# Patient Record
Sex: FEMALE | Race: WHITE | NOT HISPANIC OR LATINO | Employment: UNEMPLOYED | URBAN - NONMETROPOLITAN AREA
[De-identification: names, ages, dates, MRNs, and addresses within clinical notes are randomized per-mention and may not be internally consistent; named-entity substitution may affect disease eponyms.]

---

## 2021-06-23 LAB
GENE DIS ANL CARRIER INTERP-IMP: NORMAL
PROT C ACT/NOR PPP: 102 % (ref 70–180)
PROT S ACT/NOR PPP: 93 % (ref 60–140)

## 2021-09-13 ENCOUNTER — HOSPITAL ENCOUNTER (EMERGENCY)
Facility: HOSPITAL | Age: 21
Discharge: HOME | End: 2021-09-13
Attending: EMERGENCY MEDICINE | Admitting: EMERGENCY MEDICINE
Payer: COMMERCIAL

## 2021-09-13 VITALS
DIASTOLIC BLOOD PRESSURE: 86 MMHG | WEIGHT: 152 LBS | HEIGHT: 62 IN | TEMPERATURE: 97.5 F | RESPIRATION RATE: 20 BRPM | BODY MASS INDEX: 27.97 KG/M2 | SYSTOLIC BLOOD PRESSURE: 144 MMHG | HEART RATE: 84 BPM | OXYGEN SATURATION: 100 %

## 2021-09-13 DIAGNOSIS — F15.10 METHAMPHETAMINE ABUSE (HCC): Primary | ICD-10-CM

## 2021-09-13 PROCEDURE — 99284 EMERGENCY DEPT VISIT MOD MDM: CPT | Performed by: EMERGENCY MEDICINE

## 2021-09-13 PROCEDURE — 99283 EMERGENCY DEPT VISIT LOW MDM: CPT

## 2021-09-13 RX ORDER — TOPIRAMATE 50 MG/1
50 TABLET, FILM COATED ORAL EVERY 12 HOURS SCHEDULED
COMMUNITY

## 2021-09-13 RX ORDER — SUMATRIPTAN 100 MG/1
1 TABLET, FILM COATED ORAL
COMMUNITY
Start: 2021-06-04

## 2021-09-13 RX ORDER — AMITRIPTYLINE HYDROCHLORIDE 10 MG/1
10 TABLET, FILM COATED ORAL DAILY
COMMUNITY
Start: 2021-06-04

## 2021-09-14 NOTE — ED NOTES
Made contact with Scott File from [facility], pt  Speaking with Scott File on the phone, made aware that pt  Is seeking drug rehabilitation,   Medically cleared by Dr Vicki Durbin, RN  09/13/21 [phone]

## 2021-09-14 NOTE — ED PROVIDER NOTES
History  Chief Complaint   Patient presents with    Drug / Alcohol Assessment     Pt reports being addicted to meth, last used thursday into friday 9/10, pt reports detoxing off drug and would like to get help with rehab placement  Patient is a 80-year-old female with history of methamphetamine abuse reports that she stop using meth on Friday last week and is presenting to the ED now seeking assistance with getting into a drug rehab  Patient denies any suicidal homicidal ideation  Patient admits to occasional cannabis abuse no alcohol or other drugs  History provided by:  Patient  Drug / Alcohol Assessment  Location:  Methamphetamine abuse  Severity:  Moderate  Onset quality:  Gradual  Duration:  4 days  Timing:  Constant  Progression:  Worsening  Chronicity:  Recurrent  Associated symptoms: no abdominal pain, no chest pain, no congestion, no cough, no diarrhea, no ear pain, no fatigue, no fever, no headaches, no myalgias, no nausea, no rash, no rhinorrhea, no shortness of breath, no sore throat, no vomiting and no wheezing        Prior to Admission Medications   Prescriptions Last Dose Informant Patient Reported? Taking? SUMAtriptan (IMITREX) 100 mg tablet   Yes Yes   Sig: Take 1 tablet by mouth   amitriptyline (ELAVIL) 10 mg tablet   Yes Yes   Sig: Take 10 mg by mouth daily   topiramate (TOPAMAX) 50 MG tablet More than a month at Unknown time  Yes No   Sig: Take 50 mg by mouth every 12 (twelve) hours      Facility-Administered Medications: None       History reviewed  No pertinent past medical history  History reviewed  No pertinent surgical history  History reviewed  No pertinent family history  I have reviewed and agree with the history as documented      E-Cigarette/Vaping     E-Cigarette/Vaping Substances     Social History     Tobacco Use    Smoking status: Current Every Day Smoker     Packs/day: 0 50    Smokeless tobacco: Never Used   Substance Use Topics    Alcohol use: Not Currently    Drug use: Yes     Types: Methamphetamines, Marijuana       Review of Systems   Constitutional: Negative for activity change, appetite change, chills, fatigue and fever  HENT: Negative for congestion, ear pain, rhinorrhea and sore throat  Eyes: Negative for discharge, redness and visual disturbance  Respiratory: Negative for cough, chest tightness, shortness of breath and wheezing  Cardiovascular: Negative for chest pain and palpitations  Gastrointestinal: Negative for abdominal pain, constipation, diarrhea, nausea and vomiting  Endocrine: Negative for polydipsia and polyuria  Genitourinary: Negative for difficulty urinating, dysuria, frequency, hematuria and urgency  Musculoskeletal: Negative for arthralgias and myalgias  Skin: Negative for color change, pallor and rash  Neurological: Negative for dizziness, weakness, light-headedness, numbness and headaches  Hematological: Negative for adenopathy  Does not bruise/bleed easily  Psychiatric/Behavioral: Positive for agitation  The patient is nervous/anxious  All other systems reviewed and are negative  Physical Exam  Physical Exam  Vitals and nursing note reviewed  Constitutional:       Appearance: She is well-developed  HENT:      Head: Normocephalic and atraumatic  Right Ear: External ear normal       Left Ear: External ear normal       Nose: Nose normal    Eyes:      Conjunctiva/sclera: Conjunctivae normal       Pupils: Pupils are equal, round, and reactive to light  Cardiovascular:      Rate and Rhythm: Normal rate and regular rhythm  Heart sounds: Normal heart sounds  Pulmonary:      Effort: Pulmonary effort is normal  No respiratory distress  Breath sounds: Normal breath sounds  No wheezing or rales  Chest:      Chest wall: No tenderness  Abdominal:      General: Bowel sounds are normal  There is no distension  Palpations: Abdomen is soft  Tenderness:  There is no abdominal tenderness  There is no guarding  Musculoskeletal:         General: Normal range of motion  Cervical back: Normal range of motion and neck supple  Skin:     General: Skin is warm and dry  Neurological:      Mental Status: She is alert and oriented to person, place, and time  Cranial Nerves: No cranial nerve deficit  Sensory: No sensory deficit  Vital Signs  ED Triage Vitals [09/13/21 2105]   Temperature Pulse Respirations Blood Pressure SpO2   97 5 °F (36 4 °C) 80 22 134/99 100 %      Temp Source Heart Rate Source Patient Position - Orthostatic VS BP Location FiO2 (%)   Temporal Monitor Lying Right arm --      Pain Score       No Pain           Vitals:    09/13/21 2105 09/13/21 2200   BP: 134/99 144/86   Pulse: 80 84   Patient Position - Orthostatic VS: Lying Lying         Visual Acuity      ED Medications  Medications - No data to display    Diagnostic Studies  Results Reviewed     None                 No orders to display              Procedures  Procedures         ED Course  ED Course as of Sep 13 2328   Mon Sep 13, 2021   2207 Patient is clinically medically and hemodynamically stable with minimal to no withdrawal symptoms at present requesting only a sandwich in the  There are no grounds for involuntary commitment present she is not seeking inpatient psychiatric treatment she denies suicidal homicidal ideations denies auditory or visual hallucinations  Patient is seeking drug and alcohol rehab treatment  Advised that the best way to accomplish this is through the warm handoff referral process she was placed on the phone with the clinical outcomes group for warm handoff drug and alcohol referral/intake in the ED         2233 Warm handoff was able to obtain patient a bed in [facility] she is currently calling to determine when and how she will be transported there               patient completed warm handoff process in the ED and she is arranged to report to [facility] drug and alcohol rehab in [facility] tomorrow before noon her mom will accompany her home tonight and accompany her there as the warm handoff program was not able to obtain transportation or direct  for the patient at this time and mom is willing to give the patient arrived there and stay with her tonight  Patient is requesting discharge at this time there is no grounds for involuntary commitment she is medically stable for discharge and appropriate for discharge at this time and will report to drug and alcohol detox facility as arranged tomorrow as advised patient understands she is welcome to return  Return precautions and anticipatory guidance discussed  MDM  Number of Diagnoses or Management Options  Methamphetamine abuse ([facility] ): established and worsening     Amount and/or Complexity of Data Reviewed  Decide to obtain previous medical records or to obtain history from someone other than the patient: yes  Review and summarize past medical records: yes    Risk of Complications, Morbidity, and/or Mortality  Presenting problems: moderate  Management options: moderate    Patient Progress  Patient progress: stable      Disposition  Final diagnoses:   Methamphetamine abuse ([facility] )     Time reflects when diagnosis was documented in both MDM as applicable and the Disposition within this note     Time User Action Codes Description Comment    9/13/2021 10:32 PM Roe Brunson Add [F15 10] Methamphetamine abuse [facility])       ED Disposition     ED Disposition Condition Date/Time Comment    Discharge Stable Mon Sep 13, 2021 11:24 PM Sharan King discharge to home/self care              Follow-up Information     Follow up With Specialties Details Why [address] [facility] (Hellen Mccallum)  Call in 1 day  [facility] [place]  [facility] [address],  Internal Medicine Schedule an appointment as soon as possible for a visit in 1 rachel  [place]  [phone]            Discharge Medication List as of 9/13/2021 11:24 PM      CONTINUE these medications which have NOT CHANGED    Details   amitriptyline (ELAVIL) 10 mg tablet Take 10 mg by mouth daily, Starting Fri 6/4/2021, Historical Med      SUMAtriptan (IMITREX) 100 mg tablet Take 1 tablet by mouth, Starting Fri 6/4/2021, Historical Med      topiramate (TOPAMAX) 50 MG tablet Take 50 mg by mouth every 12 (twelve) hours, Historical Med           No discharge procedures on file      PDMP Review     None          ED Provider  Electronically Signed by           Rubén Modi DO  09/13/21 9610

## 2021-09-14 NOTE — ED NOTES
Pt  Mother to transport pt  To [place], [place] for drug rehabilitation tomorrow before 12 pm, provider made aware and pt   To be d/c home under the care of her mother     Phyllis Wilson, RN  09/13/21 [id number], RN  09/13/21 1106

## 2021-09-14 NOTE — DISCHARGE INSTRUCTIONS
Report to [facility] drug and alcohol treatment in [place] as directed before noon tomorrow for drug and alcohol treatment  Return to the ED as needed